# Patient Record
(demographics unavailable — no encounter records)

---

## 2024-10-16 NOTE — DISCUSSION/SUMMARY
[FreeTextEntry1] : 37 yo woman with obesity, s/p recent MI due to SCAD mid LAD s/p POBA, moderate LV dysfunction,  (one elective termination, + gestational DM with two pregnancies managed conservatively, no pre-eclampsia, no prematurity), who presents for a follow up.  SCAD s/p MI of the LAD as per imaging findings on OCT s/p POBA with moderate LV dysfunction  and with NSTEMI with severe RPL ds per cath report and opted for medical management 2022.  -continue asa 81 mg po daily  -Continue being off statin LDL 43   -Continue BB -we previously discussed contraceptive methods given that she is a young woman, had CV event and is on teratogenic drugs. I have referred her multiple times to GYN, but she did not go as she was not sexually active per her.  At this point she still desires pregnancy and has previously admitted that she has been sexually active.  Has been trying but no pregnancy thus far.  Previously Norvasc, statin and Losartan which were stopped on the previous visits.  I have started her on Nifedipine 90 mg daily, she was instructed to continue BB Metoprolol 75 mg daily.  Additionally, I again had a long discussion with her about dangers of pregnancy in her case another SCAD (specially in pt who already had SCAD and pregnancy even without prior SCAD has increased risk, pre-eclampsia, LV dysfunction, not surviving her pregnancy or birth or worsening her condition, etc.  She still desires pregnancy and feels "she can handle it" and "she is strong", "god will keep her safe".  Since the last visit she did undergo stress testing and TTE.  Exercise stress test 8METS, no significant ST segment changes from baseline.  TTE showed mildly reduced LV function with regional wall motion abnormalities, grade I diastolic dysfunction, NL RV function, no significant valvular ds.  I have previously provided her with MFM information and today reiterated the importance of following up with them. Additionally, I had asked her to consider a timeline for a pregnancy and to have some sort of goal when she would feel she does not wish to anymore children so we can restart her OMT.    HTN at goal -Cont Nifedipine 90mg.  Continue low salt diet, encouraged regular exercise.    Follow up with me in three months or sooner if she gets pregnant. I have spent 30 min on this encounter by seeing the pt, reviewing labs and records, discussing plan of care and documenting this encounter.  [EKG obtained to assist in diagnosis and management of assessed problem(s)] : EKG obtained to assist in diagnosis and management of assessed problem(s)

## 2024-10-16 NOTE — REVIEW OF SYSTEMS
[Fever] : no fever [Chills] : no chills [Weight Loss (___ Lbs)] : [unfilled] ~Ulb weight loss [Chest Discomfort] : no chest discomfort [Lower Ext Edema] : no extremity edema [Palpitations] : no palpitations [Orthopnea] : no orthopnea [PND] : no PND [Syncope] : no syncope [Negative] : Heme/Lymph

## 2024-10-16 NOTE — PHYSICAL EXAM
[No Xanthelasma] : no xanthelasma [Soft] : abdomen soft [Non Tender] : non-tender [Normal Radial B/L] : normal radial B/L [Normal] : alert and oriented, normal memory [de-identified] : R groin no hematoma no bruit

## 2024-10-16 NOTE — ADDENDUM
[FreeTextEntry1] : RCRI 2 points Class III Risk 10.1% 30-day risk of death, MI, or cardiac arrest.  From cardiac standpoint pt is at acceptable risk for upcoming colonoscopy.  Given history of CV disease would advise to perform her procedure at [facility] or [facility].

## 2024-10-16 NOTE — CARDIOLOGY SUMMARY
[de-identified] : 10/08/24 NSR, ant infarct  [de-identified] : 08/28/2021\par  Mitral Valve: Mitral annular calcification, otherwise\par  normal mitral valve.\par  Aortic Valve/Aorta: Calcified trileaflet aortic valve with\par  normal opening. Peak transaortic valve gradient equals 6 mm\par  Hg. Peak left ventricular outflow tract gradient equals 3\par  mm Hg.\par  Aortic Root: 3.3 cm.\par  LVOT diameter: 2 cm.\par  Left Atrium: LA volume index = 14 cc/m2.\par  Left Ventricle: Moderate segmental left ventricular\par  systolic dysfunction. Endocardial visualization enhanced\par  with intravenous injection of Ultrasonic Enhancing Agent\par  (Definity). No left ventricular thrombus. The anterior\par  septum, the inferior wall, and the inferoseptum are\par  hypokinetic.  Normal left ventricular internal dimensions\par  and wall thicknesses. Mild diastolic dysfunction (Stage I).\par  Right Heart: Severe right atrial enlargement. Normal right\par  ventricular size and function. Tricuspid valve not well\par  visualized. Pulmonic valve not well visualized.\par  Pericardium/Pleura: Normal pericardium with no pericardial\par  effusion.\par  Hemodynamic: Unable to estimate RVSP.\par   [de-identified] : 02/22/22\par  Extensive areas of left ventricular myocardial late gadolinium \par  enhancement involving the interventricular septum extending from the mid \par  left ventricle towards the apex as described above representing infarcted \par  myocardium given the above history, majority of which span  greater than \par  50% of the myocardial wall thickness.\par  \par  Smaller patchy area of late gadolinium enhancement involving the \par  inferolateral wall of the left ventricular myocardium with suggestion of \par  superimposed edema is suggestive of a more acute etiology, possibly \par  representing a new infarcted myocardium given the above history.\par   [de-identified] : 08/27/2021\par  CORONARY VESSELS: The coronary circulation is right dominant.\par  LM:   --  LM: Normal.\par  LAD:   --  Proximal LAD: There was a tubular 50 % stenosis at a site with\par  no prior intervention. There was ERIS grade 3 flow through the vessel\par  (brisk flow).\par  --  Mid LAD: There was a diffuse 99 % stenosis at a site with no prior\par  intervention. There was ERIS grade 1 flow through the vessel (slow flow\par  without perfusion).\par  --  D1: Angiography showed mild atherosclerosis with no flow limiting\par  lesions.\par  CX:   --  Circumflex: Normal.\par  RCA:   --  RCA: Normal.\par  COMPLICATIONS: There were no complications.\par  DIAGNOSTIC IMPRESSIONS: Severe stenosis of the mid LAD , diffuse lesion\par  extending into the distal LAD . POBA was performed with restortation of\par  flow.\par  OCT was performed that confirms etiology of the coronary stenosis is SCAD.\par

## 2024-10-16 NOTE — HISTORY OF PRESENT ILLNESS
[FreeTextEntry1] : 37 yo woman with obesity, s/p MI due to SCAD mid LAD s/p POBA 21, moderate LV dysfunction,  (one elective termination, + gestational DM with two pregnancies managed conservatively, no pre-eclampsia, no prematurity), another NSTEMI 2022, who presents for a follow up.  Pt reports feeling well, compliant with meds.  Feels well, no cardiac complaints.  She saw GYN, and MFM. Wants to pursue pregnancy, but she does say that she will wait till it happens naturally, thus far reports no pregnancy.  Has been taking Nifedipine and BB, reports good BP control.    Fam Hx: mother 60s alive and well, father  60s COPD, denies fam hx of MIs/SCD Soc Hx: non smoker, no ETOH abuse, no illicit drug use. Works at [employer] in the [employer] dept.

## 2025-02-13 NOTE — CARDIOLOGY SUMMARY
[de-identified] : 02/11/25 NSR, ant infarct  [de-identified] : 08/28/2021\par  Mitral Valve: Mitral annular calcification, otherwise\par  normal mitral valve.\par  Aortic Valve/Aorta: Calcified trileaflet aortic valve with\par  normal opening. Peak transaortic valve gradient equals 6 mm\par  Hg. Peak left ventricular outflow tract gradient equals 3\par  mm Hg.\par  Aortic Root: 3.3 cm.\par  LVOT diameter: 2 cm.\par  Left Atrium: LA volume index = 14 cc/m2.\par  Left Ventricle: Moderate segmental left ventricular\par  systolic dysfunction. Endocardial visualization enhanced\par  with intravenous injection of Ultrasonic Enhancing Agent\par  (Definity). No left ventricular thrombus. The anterior\par  septum, the inferior wall, and the inferoseptum are\par  hypokinetic.  Normal left ventricular internal dimensions\par  and wall thicknesses. Mild diastolic dysfunction (Stage I).\par  Right Heart: Severe right atrial enlargement. Normal right\par  ventricular size and function. Tricuspid valve not well\par  visualized. Pulmonic valve not well visualized.\par  Pericardium/Pleura: Normal pericardium with no pericardial\par  effusion.\par  Hemodynamic: Unable to estimate RVSP.\par   [de-identified] : 02/22/22\par  Extensive areas of left ventricular myocardial late gadolinium \par  enhancement involving the interventricular septum extending from the mid \par  left ventricle towards the apex as described above representing infarcted \par  myocardium given the above history, majority of which span  greater than \par  50% of the myocardial wall thickness.\par  \par  Smaller patchy area of late gadolinium enhancement involving the \par  inferolateral wall of the left ventricular myocardium with suggestion of \par  superimposed edema is suggestive of a more acute etiology, possibly \par  representing a new infarcted myocardium given the above history.\par   [de-identified] : 08/27/2021\par  CORONARY VESSELS: The coronary circulation is right dominant.\par  LM:   --  LM: Normal.\par  LAD:   --  Proximal LAD: There was a tubular 50 % stenosis at a site with\par  no prior intervention. There was ERIS grade 3 flow through the vessel\par  (brisk flow).\par  --  Mid LAD: There was a diffuse 99 % stenosis at a site with no prior\par  intervention. There was ERIS grade 1 flow through the vessel (slow flow\par  without perfusion).\par  --  D1: Angiography showed mild atherosclerosis with no flow limiting\par  lesions.\par  CX:   --  Circumflex: Normal.\par  RCA:   --  RCA: Normal.\par  COMPLICATIONS: There were no complications.\par  DIAGNOSTIC IMPRESSIONS: Severe stenosis of the mid LAD , diffuse lesion\par  extending into the distal LAD . POBA was performed with restortation of\par  flow.\par  OCT was performed that confirms etiology of the coronary stenosis is SCAD.\par

## 2025-02-13 NOTE — HISTORY OF PRESENT ILLNESS
[FreeTextEntry1] : 36 yo woman with obesity, s/p MI due to SCAD mid LAD s/p POBA 21, moderate LV dysfunction,  (one elective termination, + gestational DM with two pregnancies managed conservatively, no pre-eclampsia, no prematurity), another NSTEMI 2022, who presents for a follow up.  Pt reports feeling well, compliant with meds.  Feels well, no cardiac complaints.  She saw GYN, and MFM. Wants to pursue pregnancy at some point, but she does say that she will wait till it happens naturally, thus far reports no pregnancy.  Has been taking Nifedipine and BB, reports good BP control.    Fam Hx: mother 60s alive and well, father  60s COPD, denies fam hx of MIs/SCD Soc Hx: nonsmoker, no ETOH abuse, no illicit drug use. Works at [employer] in the [employer] dept.

## 2025-02-13 NOTE — DISCUSSION/SUMMARY
[FreeTextEntry1] : 36 yo woman with obesity, s/p recent MI due to SCAD mid LAD s/p POBA, moderate LV dysfunction,  (one elective termination, + gestational DM with two pregnancies managed conservatively, no pre-eclampsia, no prematurity), who presents for a follow up.  SCAD s/p MI of the LAD as per imaging findings on OCT s/p POBA with moderate LV dysfunction  and with NSTEMI with severe RPL ds per cath report and opted for medical management 2022.  -continue asa 81 mg po daily  -Continue being off statin LDL 43   -Continue BB -we previously discussed contraceptive methods given that she is a young woman, had CV event and is on teratogenic drugs. I have referred her multiple times to GYN, but she did not go as she was not sexually active per her.  At this point she still desires pregnancy and has previously admitted that she has been sexually active.  Has been trying but no pregnancy thus far.  Previously Norvasc, statin and Losartan which were stopped on the previous visits.  I have started her on Nifedipine 90 mg daily, she was instructed to continue BB Metoprolol 75 mg daily.  Additionally, I again had a long discussion with her about dangers of pregnancy in her case another SCAD (specially in pt who already had SCAD and pregnancy even without prior SCAD has increased risk, pre-eclampsia, LV dysfunction, not surviving her pregnancy or birth or worsening her condition, etc.  She still desires pregnancy and feels "she can handle it" and "she is strong", "god will keep her safe".  She did undergo stress testing and TTE.  Exercise stress test 8METS, no significant ST segment changes from baseline.  TTE showed mildly reduced LV function with regional wall motion abnormalities, grade I diastolic dysfunction, NL RV function, no significant valvular ds.  I have previously provided her with MFM information and today reiterated the importance of following up with them. Additionally, I had asked her to consider a timeline for a pregnancy and to have some sort of goal when she would feel she does not wish to anymore children so we can restart her OMT.    HTN at goal -Cont Nifedipine 90mg.  Continue low salt diet, encouraged regular exercise.    Follow up with me in 4 months or sooner if she gets pregnant. I have spent 30 min on this encounter by seeing the pt, reviewing labs and records, discussing plan of care and documenting this encounter.  [EKG obtained to assist in diagnosis and management of assessed problem(s)] : EKG obtained to assist in diagnosis and management of assessed problem(s)

## 2025-02-13 NOTE — ADDENDUM
[FreeTextEntry1] : RCRI 2 points Class III Risk 10.1% 30-day risk of death, MI, or cardiac arrest.  From cardiac standpoint pt is at acceptable risk for upcoming colonoscopy.  Given history of CV disease would advise to perform her procedure at [facility] or [facility].

## 2025-02-13 NOTE — PHYSICAL EXAM
[No Xanthelasma] : no xanthelasma [Soft] : abdomen soft [Non Tender] : non-tender [Normal Radial B/L] : normal radial B/L [Normal] : alert and oriented, normal memory [de-identified] : R groin no hematoma no bruit

## 2025-03-25 NOTE — PHYSICAL EXAM
[General Appearance - Alert] : alert [General Appearance - In No Acute Distress] : in no acute distress [General Appearance - Well-Appearing] : healthy appearing [Sclera] : the sclera and conjunctiva were normal [Auscultation Breath Sounds / Voice Sounds] : lungs were clear to auscultation bilaterally [Heart Rate And Rhythm] : heart rate was normal and rhythm regular [Heart Sounds] : normal S1 and S2 [Murmurs] : no murmurs [Bowel Sounds] : normal bowel sounds [Abdomen Soft] : soft [Abdomen Tenderness] : non-tender [Skin Color & Pigmentation] : normal skin color and pigmentation [] : no rash [Oriented To Time, Place, And Person] : oriented to person, place, and time [Impaired Insight] : insight and judgment were intact [Affect] : the affect was normal

## 2025-03-25 NOTE — ASSESSMENT
[FreeTextEntry1] : 38 y/o lady with a medical history of MI in August 2021 due to Spontaneous Coronary Artery dissection s/p POBA on DAPT, hx of gallstone pancreatitis s/p ERCP for CBD stone in 2019, chronic Hepatitis B here for initial visit.  Chronic hepatitis B - presumed from vertical transmission.  Pt educated about hep B, the propensity for development of HCC and liver cirrhosis over time and the importance of q 6 month follow up and serial screening for HCC Will repeat labs now for PCR trend, Liver chem, AFP/DCP, hep D status Will obtain fibroscan to evaluate for level of fibrosis and/or steatosis Liver US for HCC screening. Advised that all family members should be vaccinated if not immune.  Prior history of abnormal Ct imaging - ascending and transverse colon pericolonic stranding. s/p colonoscopy which was unrevealing.   All patient's questions were answered  Return to office in 6 months.  I will call her with her lab results and FibroScan results.